# Patient Record
Sex: FEMALE | Race: WHITE | URBAN - NONMETROPOLITAN AREA
[De-identification: names, ages, dates, MRNs, and addresses within clinical notes are randomized per-mention and may not be internally consistent; named-entity substitution may affect disease eponyms.]

---

## 2020-08-26 ENCOUNTER — TELEPHONE (OUTPATIENT)
Dept: NEPHROLOGY | Age: 60
End: 2020-08-26

## 2020-08-26 NOTE — TELEPHONE ENCOUNTER
I left messsage for patient to call us to see who she would like to see and where she would like to be seen.

## 2020-09-03 NOTE — TELEPHONE ENCOUNTER
I was talking to Gregoria Sahni to get her scheduled to see one of our doctors and she hung up on me.